# Patient Record
Sex: FEMALE | Race: WHITE | NOT HISPANIC OR LATINO | Employment: FULL TIME | URBAN - METROPOLITAN AREA
[De-identification: names, ages, dates, MRNs, and addresses within clinical notes are randomized per-mention and may not be internally consistent; named-entity substitution may affect disease eponyms.]

---

## 2023-12-20 ENCOUNTER — OFFICE VISIT (OUTPATIENT)
Dept: OBSTETRICS AND GYNECOLOGY | Facility: CLINIC | Age: 33
End: 2023-12-20
Payer: COMMERCIAL

## 2023-12-20 VITALS
DIASTOLIC BLOOD PRESSURE: 73 MMHG | SYSTOLIC BLOOD PRESSURE: 90 MMHG | WEIGHT: 137.9 LBS | BODY MASS INDEX: 24.43 KG/M2 | HEIGHT: 63 IN

## 2023-12-20 DIAGNOSIS — Z30.41 FAMILY PLANNING, BCP (BIRTH CONTROL PILLS) MAINTENANCE: ICD-10-CM

## 2023-12-20 DIAGNOSIS — Z01.419 WELL WOMAN EXAM WITH ROUTINE GYNECOLOGICAL EXAM: Primary | ICD-10-CM

## 2023-12-20 DIAGNOSIS — Z76.0 MEDICATION REFILL: ICD-10-CM

## 2023-12-20 PROCEDURE — 99385 PREV VISIT NEW AGE 18-39: CPT | Performed by: OBSTETRICS & GYNECOLOGY

## 2023-12-20 PROCEDURE — 87624 HPV HI-RISK TYP POOLED RSLT: CPT | Performed by: OBSTETRICS & GYNECOLOGY

## 2023-12-20 PROCEDURE — 88175 CYTOPATH C/V AUTO FLUID REDO: CPT | Mod: TC | Performed by: OBSTETRICS & GYNECOLOGY

## 2023-12-20 PROCEDURE — 1036F TOBACCO NON-USER: CPT | Performed by: OBSTETRICS & GYNECOLOGY

## 2023-12-20 RX ORDER — LEVONORGESTREL/ETHIN.ESTRADIOL 0.1-0.02MG
1 TABLET ORAL DAILY
Qty: 84 TABLET | Refills: 3 | Status: SHIPPED | OUTPATIENT
Start: 2023-12-20 | End: 2024-12-19

## 2023-12-20 ASSESSMENT — PATIENT HEALTH QUESTIONNAIRE - PHQ9
SUM OF ALL RESPONSES TO PHQ9 QUESTIONS 1 AND 2: 0
1. LITTLE INTEREST OR PLEASURE IN DOING THINGS: NOT AT ALL
2. FEELING DOWN, DEPRESSED OR HOPELESS: NOT AT ALL

## 2023-12-20 ASSESSMENT — ENCOUNTER SYMPTOMS
DEPRESSION: 0
OCCASIONAL FEELINGS OF UNSTEADINESS: 0
LOSS OF SENSATION IN FEET: 0

## 2023-12-20 ASSESSMENT — PAIN SCALES - GENERAL: PAINLEVEL: 0-NO PAIN

## 2023-12-20 NOTE — PROGRESS NOTES
Subjective   Norma Peguero is a 33 y.o. female who is here for a routine exam. Periods are regular every 28-30 days, lasting a few days. Dysmenorrhea:none. Cyclic symptoms include none. No intermenstrual bleeding, spotting, or discharge.      Last pap:  2019: neg  Last mammogram  Current contraception: OCP (estrogen/progesterone)  History of abnormal Pap smear: no  Family history of uterine or ovarian cancer: no  Regular self breast exam: yes  History of abnormal mammogram: no  Family history of breast cancer: no  History of abnormal lipids: no  Menstrual History:  OB History          0    Para   0    Term   0       0    AB   0    Living   0         SAB   0    IAB   0    Ectopic   0    Multiple   0    Live Births   0                  Patient's last menstrual period was 2023 (exact date).         History reviewed. No pertinent past medical history.    Past Surgical History:   Procedure Laterality Date    CHOLECYSTECTOMY  2018    Cholecystectomy Laparoscopic       Social History     Socioeconomic History    Marital status:      Spouse name: Not on file    Number of children: Not on file    Years of education: Not on file    Highest education level: Not on file   Occupational History    Not on file   Tobacco Use    Smoking status: Never    Smokeless tobacco: Never   Vaping Use    Vaping Use: Never used   Substance and Sexual Activity    Alcohol use: Not Currently     Comment: rare    Drug use: Never    Sexual activity: Yes     Partners: Male   Other Topics Concern    Not on file   Social History Narrative    Not on file     Social Determinants of Health     Financial Resource Strain: Not on file   Food Insecurity: Not on file   Transportation Needs: Not on file   Physical Activity: Not on file   Stress: Not on file   Social Connections: Not on file   Intimate Partner Violence: Not on file   Housing Stability: Not on file       No family history on file.    Prior to Admission  "medications    Medication Sig Start Date End Date Taking? Authorizing Provider   Vienva 0.1-20 mg-mcg tablet TAKE 1 TABLET BY MOUTH EVERY DAY 12/12/23   Jeremy Rivero MD       No Known Allergies           Objective   BP 90/73   Ht 1.6 m (5' 2.99\")   Wt 62.6 kg (137 lb 14.4 oz)   LMP 12/08/2023 (Exact Date)   BMI 24.43 kg/m²     Physical Exam  Vitals and nursing note reviewed.   Constitutional:       General: She is not in acute distress.     Appearance: Normal appearance. She is not ill-appearing.   HENT:      Head: Normocephalic and atraumatic.      Mouth/Throat:      Mouth: Mucous membranes are moist.      Pharynx: Oropharynx is clear.   Eyes:      Extraocular Movements: Extraocular movements intact.      Conjunctiva/sclera: Conjunctivae normal.      Pupils: Pupils are equal, round, and reactive to light.   Neck:      Thyroid: No thyroid mass, thyromegaly or thyroid tenderness.   Cardiovascular:      Rate and Rhythm: Normal rate and regular rhythm.      Pulses: Normal pulses.      Heart sounds: Normal heart sounds. No murmur heard.  Pulmonary:      Effort: Pulmonary effort is normal.      Breath sounds: No wheezing or rhonchi.   Chest:   Breasts:     Right: Normal. No swelling, bleeding, inverted nipple, mass, nipple discharge, skin change or tenderness.      Left: Normal. No swelling, bleeding, inverted nipple, mass, nipple discharge, skin change or tenderness.   Abdominal:      General: Bowel sounds are normal. There is no distension.      Palpations: There is no mass.      Tenderness: There is no abdominal tenderness. There is no guarding or rebound.      Hernia: No hernia is present. There is no hernia in the left inguinal area or right inguinal area.   Genitourinary:     General: Normal vulva.      Pubic Area: No rash.       Labia:         Right: No rash, tenderness, lesion or injury.         Left: No rash, tenderness, lesion or injury.       Urethra: No prolapse or urethral swelling.      Vagina: No " signs of injury. No vaginal discharge, tenderness or prolapsed vaginal walls.      Cervix: No cervical motion tenderness, discharge, friability, lesion, erythema or cervical bleeding.      Uterus: Not deviated, not enlarged, not fixed, not tender and no uterine prolapse.       Adnexa:         Right: No mass, tenderness or fullness.          Left: No mass, tenderness or fullness.     Musculoskeletal:         General: No swelling, tenderness, deformity or signs of injury. Normal range of motion.      Cervical back: No rigidity.   Lymphadenopathy:      Cervical: No cervical adenopathy.      Upper Body:      Right upper body: No axillary adenopathy.      Left upper body: No axillary adenopathy.      Lower Body: No right inguinal adenopathy. No left inguinal adenopathy.   Skin:     General: Skin is warm.      Findings: No lesion or rash.   Neurological:      General: No focal deficit present.      Mental Status: She is alert and oriented to person, place, and time.   Psychiatric:         Mood and Affect: Mood normal.         Behavior: Behavior normal.         Thought Content: Thought content normal.         Judgment: Judgment normal.         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   All questions answered.  Await pap smear results.  Breast self exam technique reviewed and patient encouraged to perform self-exam monthly.  Contraception: OCP (estrogen/progesterone).  Diagnosis explained in detail, including differential.  Discussed healthy lifestyle modifications..

## 2024-01-11 LAB
CYTOLOGY CMNT CVX/VAG CYTO-IMP: NORMAL
HPV HR 12 DNA GENITAL QL NAA+PROBE: NEGATIVE
HPV HR GENOTYPES PNL CVX NAA+PROBE: NEGATIVE
HPV16 DNA SPEC QL NAA+PROBE: NEGATIVE
HPV18 DNA SPEC QL NAA+PROBE: NEGATIVE
LAB AP CONTRACEPTIVE HISTORY: NORMAL
LAB AP HPV GENOTYPE QUESTION: YES
LAB AP HPV HR: NORMAL
LABORATORY COMMENT REPORT: NORMAL
LMP START DATE: NORMAL
PATH REPORT.TOTAL CANCER: NORMAL

## 2024-07-16 ASSESSMENT — PROMIS GLOBAL HEALTH SCALE
RATE_AVERAGE_FATIGUE: MILD
RATE_PHYSICAL_HEALTH: GOOD
RATE_MENTAL_HEALTH: FAIR
RATE_AVERAGE_PAIN: 0
CARRYOUT_SOCIAL_ACTIVITIES: GOOD
CARRYOUT_PHYSICAL_ACTIVITIES: COMPLETELY
EMOTIONAL_PROBLEMS: SOMETIMES
RATE_QUALITY_OF_LIFE: FAIR
RATE_GENERAL_HEALTH: GOOD
RATE_SOCIAL_SATISFACTION: FAIR

## 2024-07-17 ENCOUNTER — OFFICE VISIT (OUTPATIENT)
Dept: PRIMARY CARE | Facility: CLINIC | Age: 34
End: 2024-07-17
Payer: COMMERCIAL

## 2024-07-17 VITALS
HEIGHT: 62 IN | TEMPERATURE: 97.8 F | HEART RATE: 85 BPM | WEIGHT: 116 LBS | OXYGEN SATURATION: 97 % | BODY MASS INDEX: 21.35 KG/M2 | SYSTOLIC BLOOD PRESSURE: 90 MMHG | DIASTOLIC BLOOD PRESSURE: 62 MMHG

## 2024-07-17 DIAGNOSIS — Z23 ENCOUNTER FOR IMMUNIZATION: ICD-10-CM

## 2024-07-17 DIAGNOSIS — H69.91 ACUTE DYSFUNCTION OF RIGHT EUSTACHIAN TUBE: ICD-10-CM

## 2024-07-17 DIAGNOSIS — Z00.00 ANNUAL PHYSICAL EXAM: Primary | ICD-10-CM

## 2024-07-17 PROCEDURE — 99395 PREV VISIT EST AGE 18-39: CPT | Performed by: FAMILY MEDICINE

## 2024-07-17 PROCEDURE — 90471 IMMUNIZATION ADMIN: CPT | Performed by: FAMILY MEDICINE

## 2024-07-17 PROCEDURE — 90715 TDAP VACCINE 7 YRS/> IM: CPT | Performed by: FAMILY MEDICINE

## 2024-07-17 PROCEDURE — 3008F BODY MASS INDEX DOCD: CPT | Performed by: FAMILY MEDICINE

## 2024-07-17 RX ORDER — METHYLPREDNISOLONE 4 MG/1
TABLET ORAL
Qty: 21 TABLET | Refills: 0 | Status: SHIPPED | OUTPATIENT
Start: 2024-07-17 | End: 2024-07-24

## 2024-07-17 RX ORDER — FLUTICASONE PROPIONATE 50 MCG
1 SPRAY, SUSPENSION (ML) NASAL DAILY
Qty: 16 G | Refills: 11 | Status: SHIPPED | OUTPATIENT
Start: 2024-07-17 | End: 2025-07-17

## 2024-07-17 RX ORDER — SPIRONOLACTONE 100 MG/1
100 TABLET, FILM COATED ORAL 2 TIMES DAILY
COMMUNITY
Start: 2024-05-12

## 2024-07-17 ASSESSMENT — PATIENT HEALTH QUESTIONNAIRE - PHQ9
1. LITTLE INTEREST OR PLEASURE IN DOING THINGS: NOT AT ALL
2. FEELING DOWN, DEPRESSED OR HOPELESS: NOT AT ALL
SUM OF ALL RESPONSES TO PHQ9 QUESTIONS 1 AND 2: 0

## 2024-07-17 ASSESSMENT — PAIN SCALES - GENERAL: PAINLEVEL: 0-NO PAIN

## 2024-07-17 ASSESSMENT — LIFESTYLE VARIABLES: HOW MANY STANDARD DRINKS CONTAINING ALCOHOL DO YOU HAVE ON A TYPICAL DAY: PATIENT DOES NOT DRINK

## 2024-07-17 NOTE — PROGRESS NOTES
History Of Present Illness  Norma Peguero is a 34 y.o. female presenting for Ear Fullness (Concerns with right blockage for 1month)  .    HPI     Health maintenance: She sees OB/GYN, Dr. Finnegan, last visit 12/20/2023 when she had an abnormal Pap.  Declines labs.  Due for tetanus.    Also complains about right ear fullness.  No pain, tinnitus, fevers    Past Medical History  Patient Active Problem List    Diagnosis Date Noted    Family planning, BCP (birth control pills) maintenance 12/20/2023        Medications  Current Outpatient Medications   Medication Sig Dispense Refill    levonorgestreL-ethinyl estrad (Vienva) 0.1-20 mg-mcg tablet Take 1 tablet by mouth once daily. 84 tablet 3    spironolactone (Aldactone) 100 mg tablet Take 1 tablet (100 mg) by mouth 2 times a day.      fluticasone (Flonase) 50 mcg/actuation nasal spray Administer 1 spray into each nostril once daily. Shake gently. Before first use, prime pump. After use, clean tip and replace cap. 16 g 11     No current facility-administered medications for this visit.        Surgical History  She has a past surgical history that includes Cholecystectomy (03/07/2018).     Social History  She reports that she has never smoked. She has never used smokeless tobacco. She reports current alcohol use. She reports that she does not currently use drugs after having used the following drugs: Marijuana.    Family History  Family History   Problem Relation Name Age of Onset    Hernia Mother Melony Ibrahim     Hypertension Mother Melony Mancinijerryjose armando     Miscarriages / Stillbirths Mother Melony Mancinimike     Depression Brother Jamin Patrice     Depression Brother Shahram Patrice         Allergies  Patient has no known allergies.    Immunizations  Immunization History   Administered Date(s) Administered    DTaP, Unspecified 07/02/1998    HPV 9-valent vaccine (GARDASIL 9) 07/25/2022    Hepatitis B vaccine, 19 yrs and under (RECOMBIVAX, ENGERIX) 07/07/2005, 08/04/2005  "   MMR vaccine, subcutaneous (MMR II) 07/07/2005    Meningococcal ACWY-D (Menactra) 4-valent conjugate vaccine 05/25/2010    Moderna SARS-CoV-2 Vaccination 04/06/2021, 05/04/2021, 12/18/2021    OPV 07/02/1998    Td (adult), unspecified 08/04/2005    Tdap vaccine, age 7 year and older (BOOSTRIX, ADACEL) 05/31/2011, 07/17/2024        ROS  Negative, except as discussed in HPI     Vitals  BP 90/62   Pulse 85   Temp 36.6 °C (97.8 °F)   Ht 1.575 m (5' 2\")   Wt 52.6 kg (116 lb)   LMP 07/03/2024 (Approximate)   SpO2 97%   BMI 21.22 kg/m²      Physical Exam  Vitals and nursing note reviewed.   Constitutional:       Appearance: Normal appearance.   HENT:      Head: Normocephalic.      Right Ear: Tympanic membrane normal.      Left Ear: Tympanic membrane normal.      Nose: Nose normal.      Mouth/Throat:      Mouth: Mucous membranes are moist.   Eyes:      Extraocular Movements: Extraocular movements intact.      Conjunctiva/sclera: Conjunctivae normal.      Pupils: Pupils are equal, round, and reactive to light.   Cardiovascular:      Rate and Rhythm: Normal rate and regular rhythm.      Heart sounds: Normal heart sounds.   Pulmonary:      Effort: Pulmonary effort is normal. No respiratory distress.      Breath sounds: Normal breath sounds.   Abdominal:      General: Abdomen is flat.      Palpations: Abdomen is soft.      Tenderness: There is no abdominal tenderness.   Musculoskeletal:      Cervical back: Neck supple.   Lymphadenopathy:      Cervical: No cervical adenopathy.   Skin:     General: Skin is warm and dry.      Findings: No rash.   Neurological:      General: No focal deficit present.      Mental Status: She is alert. Mental status is at baseline.      Coordination: Coordination normal.      Gait: Gait normal.      Deep Tendon Reflexes: Reflexes normal.   Psychiatric:         Mood and Affect: Mood normal.         Behavior: Behavior normal.         Relevant Results  Lab Results   Component Value Date    WBC " "6.5 02/12/2018    HGB 13.7 02/12/2018    HCT 43.0 02/12/2018    MCV 96 02/12/2018     02/12/2018     Lab Results   Component Value Date     02/12/2018    K 4.3 02/12/2018     02/12/2018    CO2 26 02/12/2018    BUN 10 02/12/2018    CREATININE 0.88 02/12/2018    CALCIUM 9.4 02/12/2018    PROT 7.0 02/12/2018    BILITOT 0.9 02/12/2018    ALKPHOS 45 02/12/2018    ALT 11 02/12/2018    AST 13 02/12/2018    GLUCOSE 81 02/12/2018     No results found for: \"HGBA1C\"  No results found for: \"TSH\", \"FREET4\"   Lab Results   Component Value Date    CHOL 188 02/12/2018    TRIG 115 02/12/2018    HDL 53.8 02/12/2018           Assessment/Plan   Norma was seen today for ear fullness.  Diagnoses and all orders for this visit:  Annual physical exam (Primary)  Comments:  Declines labs  Encounter for immunization  -     Tdap vaccine, age 7 years and older  Acute dysfunction of right eustachian tube  -     fluticasone (Flonase) 50 mcg/actuation nasal spray; Administer 1 spray into each nostril once daily. Shake gently. Before first use, prime pump. After use, clean tip and replace cap.  -     methylPREDNISolone (Medrol Dospak) 4 mg tablets; Take as directed on package.         Counseling:   Medication education:   -Education:  The patient is counseled regarding potential side-effects of any and all new medications  -Understanding:  Patient expressed understanding of information discussed today  -Adherence:  No barriers to adherence identified    Final treatment plan is a result of shared decision making with patient.         Jeremy Rivero MD   "

## 2024-09-18 ENCOUNTER — LAB (OUTPATIENT)
Dept: LAB | Facility: LAB | Age: 34
End: 2024-09-18
Payer: COMMERCIAL

## 2024-09-18 DIAGNOSIS — L50.9 URTICARIA, UNSPECIFIED: Primary | ICD-10-CM

## 2024-09-18 LAB
C3 SERPL-MCNC: 162 MG/DL (ref 87–200)
C4 SERPL-MCNC: 43 MG/DL (ref 10–50)
THYROPEROXIDASE AB SERPL-ACNC: <28 IU/ML

## 2024-09-18 PROCEDURE — 86800 THYROGLOBULIN ANTIBODY: CPT

## 2024-09-18 PROCEDURE — 86160 COMPLEMENT ANTIGEN: CPT

## 2024-09-18 PROCEDURE — 82784 ASSAY IGA/IGD/IGG/IGM EACH: CPT

## 2024-09-18 PROCEDURE — 86376 MICROSOMAL ANTIBODY EACH: CPT

## 2024-09-18 PROCEDURE — 86038 ANTINUCLEAR ANTIBODIES: CPT

## 2024-09-18 PROCEDURE — 36415 COLL VENOUS BLD VENIPUNCTURE: CPT

## 2024-09-19 LAB
IGA SERPL-MCNC: 122 MG/DL (ref 70–400)
IGG SERPL-MCNC: 915 MG/DL (ref 700–1600)
IGM SERPL-MCNC: 77 MG/DL (ref 40–230)

## 2024-09-20 LAB
ANA SER QL HEP2 SUBST: NEGATIVE
THYROGLOB AB SERPL-ACNC: <0.9 IU/ML (ref 0–4)

## 2024-11-04 DIAGNOSIS — L50.9 URTICARIA, UNSPECIFIED: Primary | ICD-10-CM

## 2024-11-07 ENCOUNTER — LAB (OUTPATIENT)
Dept: LAB | Facility: LAB | Age: 34
End: 2024-11-07
Payer: COMMERCIAL

## 2024-11-07 DIAGNOSIS — L50.9 URTICARIA, UNSPECIFIED: ICD-10-CM

## 2024-11-07 LAB — IGE SERPL-ACNC: 364 IU/ML (ref 0–214)

## 2024-11-07 PROCEDURE — 82785 ASSAY OF IGE: CPT

## 2024-11-07 PROCEDURE — 36415 COLL VENOUS BLD VENIPUNCTURE: CPT

## 2025-03-17 ENCOUNTER — OFFICE VISIT (OUTPATIENT)
Facility: CLINIC | Age: 35
End: 2025-03-17
Payer: COMMERCIAL

## 2025-03-17 VITALS
WEIGHT: 137.4 LBS | DIASTOLIC BLOOD PRESSURE: 71 MMHG | SYSTOLIC BLOOD PRESSURE: 100 MMHG | HEIGHT: 61 IN | BODY MASS INDEX: 25.94 KG/M2

## 2025-03-17 DIAGNOSIS — Z01.419 WELL WOMAN EXAM WITH ROUTINE GYNECOLOGICAL EXAM: Primary | ICD-10-CM

## 2025-03-17 DIAGNOSIS — Z30.41 FAMILY PLANNING, BCP (BIRTH CONTROL PILLS) MAINTENANCE: ICD-10-CM

## 2025-03-17 PROCEDURE — 99395 PREV VISIT EST AGE 18-39: CPT | Performed by: OBSTETRICS & GYNECOLOGY

## 2025-03-17 PROCEDURE — 3008F BODY MASS INDEX DOCD: CPT | Performed by: OBSTETRICS & GYNECOLOGY

## 2025-03-17 RX ORDER — OMALIZUMAB 150 MG/ML
INJECTION, SOLUTION SUBCUTANEOUS
COMMUNITY
Start: 2025-02-10

## 2025-03-17 RX ORDER — EPINEPHRINE 0.3 MG/.3ML
INJECTION SUBCUTANEOUS
COMMUNITY
Start: 2025-01-07

## 2025-03-17 ASSESSMENT — PATIENT HEALTH QUESTIONNAIRE - PHQ9
2. FEELING DOWN, DEPRESSED OR HOPELESS: NOT AT ALL
1. LITTLE INTEREST OR PLEASURE IN DOING THINGS: NOT AT ALL
SUM OF ALL RESPONSES TO PHQ9 QUESTIONS 1 AND 2: 0

## 2025-03-17 ASSESSMENT — ENCOUNTER SYMPTOMS
DEPRESSION: 0
LOSS OF SENSATION IN FEET: 0
OCCASIONAL FEELINGS OF UNSTEADINESS: 0

## 2025-03-17 ASSESSMENT — PAIN SCALES - GENERAL: PAINLEVEL_OUTOF10: 0-NO PAIN

## 2025-03-17 NOTE — PROGRESS NOTES
Subjective   Norma Peguero is a 34 y.o. female who is here for a routine exam. Periods are regular every 28-30 days, lasting a few days. Dysmenorrhea:none. Cyclic symptoms include none. No intermenstrual bleeding, spotting, or discharge. Doing well on current OCP      Last pap:  : neg  Last mammogram  Current contraception: OCP (estrogen/progesterone)  History of abnormal Pap smear: no  Family history of uterine or ovarian cancer: no  Regular self breast exam: yes  History of abnormal mammogram: no  Family history of breast cancer: no  History of abnormal lipids: no  Menstrual History:  OB History          0    Para   0    Term   0       0    AB   0    Living   0         SAB   0    IAB   0    Ectopic   0    Multiple   0    Live Births   0                  Patient's last menstrual period was 2025 (approximate).         Past Medical History:   Diagnosis Date    Allergic     Anxiety     Depression     Eczema     Urinary tract infection     Varicella        Past Surgical History:   Procedure Laterality Date    CHOLECYSTECTOMY  2018    Cholecystectomy Laparoscopic       Social History     Socioeconomic History    Marital status:      Spouse name: Not on file    Number of children: Not on file    Years of education: Not on file    Highest education level: Not on file   Occupational History    Not on file   Tobacco Use    Smoking status: Never    Smokeless tobacco: Never   Vaping Use    Vaping status: Never Used   Substance and Sexual Activity    Alcohol use: Yes     Comment: I have maybe one mixed drink a month    Drug use: Not Currently     Types: Marijuana    Sexual activity: Yes     Partners: Male     Birth control/protection: OCP   Other Topics Concern    Not on file   Social History Narrative    Not on file     Social Drivers of Health     Financial Resource Strain: Not on file   Food Insecurity: Not on file   Transportation Needs: Not on file   Physical Activity: Not on file  "  Stress: Not on file   Social Connections: Not on file   Intimate Partner Violence: Not on file   Housing Stability: Not on file       Family History   Problem Relation Name Age of Onset    Hernia Mother Melony Ibrahim     Hypertension Mother Melony Ibrahim     Miscarriages / Stillbirths Mother Melony Ibrahim     Depression Brother Jamin Ibrahim     Depression Brother Shahram Ibrahim        Prior to Admission medications    Medication Sig Start Date End Date Taking? Authorizing Provider   EPINEPHrine 0.3 mg/0.3 mL injection syringe INJECT 1 PEN INTO MUSCLE OF THIGH AS DIRECTED 1/7/25  Yes Historical Provider, MD   Xolair 150 mg/mL subcutaneous auto-injector  2/10/25  Yes Historical Provider, MD   fluticasone (Flonase) 50 mcg/actuation nasal spray Administer 1 spray into each nostril once daily. Shake gently. Before first use, prime pump. After use, clean tip and replace cap.  Patient not taking: Reported on 3/17/2025 7/17/24 7/17/25  Jeremy Rivero MD   levonorgestreL-ethinyl estrad (Vienva) 0.1-20 mg-mcg tablet Take 1 tablet by mouth once daily. 12/20/23 12/19/24  Abimbola Finnegan DO   spironolactone (Aldactone) 100 mg tablet Take 1 tablet (100 mg) by mouth 2 times a day. 5/12/24   Historical Provider, MD       No Known Allergies           Objective   /71   Ht 1.549 m (5' 1\")   Wt 62.3 kg (137 lb 6.4 oz)   LMP 02/28/2025 (Approximate)   BMI 25.96 kg/m²     Physical Exam  Vitals and nursing note reviewed.   Constitutional:       General: She is not in acute distress.     Appearance: Normal appearance. She is not ill-appearing.   HENT:      Head: Normocephalic and atraumatic.      Mouth/Throat:      Mouth: Mucous membranes are moist.      Pharynx: Oropharynx is clear.   Eyes:      Extraocular Movements: Extraocular movements intact.      Conjunctiva/sclera: Conjunctivae normal.      Pupils: Pupils are equal, round, and reactive to light.   Neck:      Thyroid: No thyroid mass, thyromegaly or thyroid " tenderness.   Cardiovascular:      Rate and Rhythm: Normal rate and regular rhythm.      Pulses: Normal pulses.      Heart sounds: Normal heart sounds. No murmur heard.  Pulmonary:      Effort: Pulmonary effort is normal.      Breath sounds: No wheezing or rhonchi.   Chest:   Breasts:     Right: Normal. No swelling, bleeding, inverted nipple, mass, nipple discharge, skin change or tenderness.      Left: Normal. No swelling, bleeding, inverted nipple, mass, nipple discharge, skin change or tenderness.   Abdominal:      General: Bowel sounds are normal. There is no distension.      Palpations: There is no mass.      Tenderness: There is no abdominal tenderness. There is no guarding or rebound.      Hernia: No hernia is present. There is no hernia in the left inguinal area or right inguinal area.   Genitourinary:     General: Normal vulva.      Pubic Area: No rash.       Labia:         Right: No rash, tenderness, lesion or injury.         Left: No rash, tenderness, lesion or injury.       Urethra: No prolapse or urethral swelling.      Vagina: No signs of injury. No vaginal discharge, tenderness or prolapsed vaginal walls.      Cervix: No cervical motion tenderness, discharge, friability, lesion, erythema or cervical bleeding.      Uterus: Not deviated, not enlarged, not fixed, not tender and no uterine prolapse.       Adnexa:         Right: No mass, tenderness or fullness.          Left: No mass, tenderness or fullness.     Musculoskeletal:         General: No swelling, tenderness, deformity or signs of injury. Normal range of motion.      Cervical back: No rigidity.   Lymphadenopathy:      Cervical: No cervical adenopathy.      Upper Body:      Right upper body: No axillary adenopathy.      Left upper body: No axillary adenopathy.      Lower Body: No right inguinal adenopathy. No left inguinal adenopathy.   Skin:     General: Skin is warm.      Findings: No lesion or rash.   Neurological:      General: No focal  deficit present.      Mental Status: She is alert and oriented to person, place, and time.   Psychiatric:         Mood and Affect: Mood normal.         Behavior: Behavior normal.         Thought Content: Thought content normal.         Judgment: Judgment normal.         Assessment    Problem List Items Addressed This Visit       Family planning, BCP (birth control pills) maintenance     Other Visit Diagnoses       Well woman exam with routine gynecological exam    -  Primary             Follow up in about 1 year (around 3/17/2026) for well woman exam.   All questions answered.  Breast self exam technique reviewed and patient encouraged to perform self-exam monthly.  Contraception: OCP (estrogen/progesterone).  Diagnosis explained in detail, including differential.  Discussed healthy lifestyle modifications..